# Patient Record
Sex: MALE | Race: WHITE | NOT HISPANIC OR LATINO | Employment: FULL TIME | URBAN - METROPOLITAN AREA
[De-identification: names, ages, dates, MRNs, and addresses within clinical notes are randomized per-mention and may not be internally consistent; named-entity substitution may affect disease eponyms.]

---

## 2018-02-13 ENCOUNTER — TELEPHONE (OUTPATIENT)
Dept: FAMILY MEDICINE CLINIC | Facility: CLINIC | Age: 33
End: 2018-02-13

## 2018-02-13 DIAGNOSIS — J11.1 INFLUENZA: Primary | ICD-10-CM

## 2018-02-13 RX ORDER — OSELTAMIVIR PHOSPHATE 75 MG/1
75 CAPSULE ORAL 2 TIMES DAILY
Qty: 10 CAPSULE | Refills: 0 | Status: SHIPPED | OUTPATIENT
Start: 2018-02-13 | End: 2018-02-18

## 2018-02-13 NOTE — TELEPHONE ENCOUNTER
2 children were dx with flu a & b started on meds  Wife not feeling well  Should he start meds?     Please advise    Pharm - [facility]  Allergies - none    Pt is aware if no call back will check pharm

## 2018-06-22 ENCOUNTER — OFFICE VISIT (OUTPATIENT)
Dept: URGENT CARE | Facility: MEDICAL CENTER | Age: 33
End: 2018-06-22
Payer: COMMERCIAL

## 2018-06-22 ENCOUNTER — APPOINTMENT (OUTPATIENT)
Dept: RADIOLOGY | Facility: MEDICAL CENTER | Age: 33
End: 2018-06-22
Payer: COMMERCIAL

## 2018-06-22 VITALS
BODY MASS INDEX: 25.36 KG/M2 | DIASTOLIC BLOOD PRESSURE: 88 MMHG | WEIGHT: 181.8 LBS | OXYGEN SATURATION: 99 % | TEMPERATURE: 98.5 F | RESPIRATION RATE: 20 BRPM | HEART RATE: 90 BPM | SYSTOLIC BLOOD PRESSURE: 128 MMHG

## 2018-06-22 DIAGNOSIS — S93.602A FOOT SPRAIN, LEFT, INITIAL ENCOUNTER: Primary | ICD-10-CM

## 2018-06-22 DIAGNOSIS — M79.672 LEFT FOOT PAIN: ICD-10-CM

## 2018-06-22 PROCEDURE — 73630 X-RAY EXAM OF FOOT: CPT

## 2018-06-22 PROCEDURE — 99213 OFFICE O/P EST LOW 20 MIN: CPT | Performed by: PHYSICIAN ASSISTANT

## 2018-06-22 NOTE — PATIENT INSTRUCTIONS
No fracture noted on xray, will call if radiology report differs  Ace wrap applied in office  Use crutches from home as needed for reduced weight bearing  Continue tylenol, ibuprofen, ice and rest   Follow up with your PCP for persistent symptoms  Go to the ER for any distress

## 2018-06-22 NOTE — PROGRESS NOTES
[facility] Now        NAME: Benjamin Hernández is a 28 y o  male  : 1985    MRN: [id number]  DATE: 2018  TIME: 7:28 PM    Assessment and Plan   Foot sprain, left, initial encounter [L43 602A]  1  Foot sprain, left, initial encounter  XR foot 3+ vw left         Patient Instructions     No fracture noted on xray, will call if radiology report differs  Ace wrap applied in office  Use crutches from home as needed for reduced weight bearing  Continue tylenol, ibuprofen, ice and rest   Follow up with PCP in 3-5 days  Proceed to  ER if symptoms worsen  Chief Complaint     Chief Complaint   Patient presents with    Foot Injury         History of Present Illness       This is a 28year old male presenting for pain in his left foot x 1 day  He was running today when he stepped into a pothole and rolled his ankle  He is having pain in a pinpoint area on the dorsal aspect of his foot between th 4th and 5th metatarsals  It is difficult to bear weight on the distal aspect of his foot  He has been icing, ace wrapping, and taking ibuprofen for the pain  No previous injury to the foot  Review of Systems   Review of Systems   Constitutional: Negative for fatigue and fever  Gastrointestinal: Negative for nausea and vomiting  Musculoskeletal: Positive for arthralgias, gait problem and joint swelling  Negative for back pain, myalgias, neck pain and neck stiffness  Skin: Negative for rash  Neurological: Negative for weakness and numbness  Current Medications     No current outpatient prescriptions on file  Current Allergies     Allergies as of 2018    (No Known Allergies)            The following portions of the patient's history were reviewed and updated as appropriate: allergies, current medications, past family history, past medical history, past social history, past surgical history and problem list      History reviewed  No pertinent past medical history      History reviewed  No pertinent surgical history  Family History   Problem Relation Age of Onset    No Known Problems Mother     No Known Problems Father          Medications have been verified  Objective   /88 (BP Location: Right arm, Patient Position: Sitting, Cuff Size: Standard)   Pulse 90   Temp 98 5 °F (36 9 °C) (Temporal)   Resp 20   Wt 82 5 kg (181 lb 12 8 oz)   SpO2 99%   BMI 25 36 kg/m²        Physical Exam     Physical Exam   Constitutional: He appears well-developed and well-nourished  No distress  HENT:   Head: Normocephalic and atraumatic  Nose: Nose normal    Eyes: Conjunctivae and EOM are normal  Pupils are equal, round, and reactive to light  Cardiovascular: Normal rate, regular rhythm and normal heart sounds  Pulmonary/Chest: Effort normal and breath sounds normal  No respiratory distress  He has no wheezes  He has no rales  Musculoskeletal:   Left foot: There is a mild amount of edema, no ecchymosis or open wounds  TTP to the dorsal aspect of the foot between the 4th and 5th metatarsals  No TTP of the ankle, proximal fibula, or toes  FROM of the ankle and foot, sensation intact, distal cap refill less than 3 seconds  Patient is walking with an antalgic gait  Neurological: He is alert  Skin: Skin is warm and dry  He is not diaphoretic  Nursing note and vitals reviewed

## 2019-09-13 ENCOUNTER — TELEPHONE (OUTPATIENT)
Dept: FAMILY MEDICINE CLINIC | Facility: CLINIC | Age: 34
End: 2019-09-13

## 2019-12-27 ENCOUNTER — TELEPHONE (OUTPATIENT)
Dept: FAMILY MEDICINE CLINIC | Facility: CLINIC | Age: 34
End: 2019-12-27

## 2019-12-27 DIAGNOSIS — R68.89 FLU-LIKE SYMPTOMS: Primary | ICD-10-CM

## 2019-12-27 RX ORDER — OSELTAMIVIR PHOSPHATE 75 MG/1
75 CAPSULE ORAL EVERY 12 HOURS SCHEDULED
Qty: 10 CAPSULE | Refills: 0 | Status: SHIPPED | OUTPATIENT
Start: 2019-12-27 | End: 2020-01-01

## 2020-08-14 ENCOUNTER — APPOINTMENT (EMERGENCY)
Dept: RADIOLOGY | Facility: HOSPITAL | Age: 35
End: 2020-08-14
Payer: COMMERCIAL

## 2020-08-14 ENCOUNTER — HOSPITAL ENCOUNTER (EMERGENCY)
Facility: HOSPITAL | Age: 35
Discharge: HOME | End: 2020-08-14
Attending: EMERGENCY MEDICINE
Payer: COMMERCIAL

## 2020-08-14 VITALS
DIASTOLIC BLOOD PRESSURE: 98 MMHG | BODY MASS INDEX: 26.11 KG/M2 | SYSTOLIC BLOOD PRESSURE: 160 MMHG | HEART RATE: 81 BPM | HEIGHT: 71 IN | OXYGEN SATURATION: 99 % | RESPIRATION RATE: 18 BRPM | WEIGHT: 186.51 LBS | TEMPERATURE: 98.4 F

## 2020-08-14 DIAGNOSIS — R07.89 ATYPICAL CHEST PAIN: Primary | ICD-10-CM

## 2020-08-14 DIAGNOSIS — J30.9 ALLERGIC RHINITIS: ICD-10-CM

## 2020-08-14 LAB
ALBUMIN SERPL BCP-MCNC: 4.5 G/DL (ref 3.5–5)
ALP SERPL-CCNC: 101 U/L (ref 46–116)
ALT SERPL W P-5'-P-CCNC: 48 U/L (ref 12–78)
ANION GAP SERPL CALCULATED.3IONS-SCNC: 10 MMOL/L (ref 4–13)
AST SERPL W P-5'-P-CCNC: 25 U/L (ref 5–45)
ATRIAL RATE: 85 BPM
BASOPHILS # BLD AUTO: 0.07 THOUSANDS/ΜL (ref 0–0.1)
BASOPHILS NFR BLD AUTO: 1 % (ref 0–1)
BILIRUB SERPL-MCNC: 0.94 MG/DL (ref 0.2–1)
BILIRUB UR QL STRIP: NEGATIVE
BUN SERPL-MCNC: 10 MG/DL (ref 5–25)
CALCIUM SERPL-MCNC: 9.2 MG/DL (ref 8.3–10.1)
CHLORIDE SERPL-SCNC: 99 MMOL/L (ref 100–108)
CLARITY UR: CLEAR
CO2 SERPL-SCNC: 28 MMOL/L (ref 21–32)
COLOR UR: YELLOW
CREAT SERPL-MCNC: 1.16 MG/DL (ref 0.6–1.3)
EOSINOPHIL # BLD AUTO: 0.1 THOUSAND/ΜL (ref 0–0.61)
EOSINOPHIL NFR BLD AUTO: 2 % (ref 0–6)
ERYTHROCYTE [DISTWIDTH] IN BLOOD BY AUTOMATED COUNT: 12.3 % (ref 11.6–15.1)
GFR SERPL CREATININE-BSD FRML MDRD: 81 ML/MIN/1.73SQ M
GLUCOSE SERPL-MCNC: 126 MG/DL (ref 65–140)
GLUCOSE UR STRIP-MCNC: NEGATIVE MG/DL
HCT VFR BLD AUTO: 50.5 % (ref 36.5–49.3)
HGB BLD-MCNC: 17.3 G/DL (ref 12–17)
HGB UR QL STRIP.AUTO: NEGATIVE
IMM GRANULOCYTES # BLD AUTO: 0.02 THOUSAND/UL (ref 0–0.2)
IMM GRANULOCYTES NFR BLD AUTO: 0 % (ref 0–2)
KETONES UR STRIP-MCNC: NEGATIVE MG/DL
LEUKOCYTE ESTERASE UR QL STRIP: NEGATIVE
LYMPHOCYTES # BLD AUTO: 1.25 THOUSANDS/ΜL (ref 0.6–4.47)
LYMPHOCYTES NFR BLD AUTO: 19 % (ref 14–44)
MCH RBC QN AUTO: 30.5 PG (ref 26.8–34.3)
MCHC RBC AUTO-ENTMCNC: 34.3 G/DL (ref 31.4–37.4)
MCV RBC AUTO: 89 FL (ref 82–98)
MONOCYTES # BLD AUTO: 0.47 THOUSAND/ΜL (ref 0.17–1.22)
MONOCYTES NFR BLD AUTO: 7 % (ref 4–12)
NEUTROPHILS # BLD AUTO: 4.6 THOUSANDS/ΜL (ref 1.85–7.62)
NEUTS SEG NFR BLD AUTO: 71 % (ref 43–75)
NITRITE UR QL STRIP: NEGATIVE
NRBC BLD AUTO-RTO: 0 /100 WBCS
P AXIS: 49 DEGREES
PH UR STRIP.AUTO: 7.5 [PH] (ref 4.5–8)
PLATELET # BLD AUTO: 186 THOUSANDS/UL (ref 149–390)
PMV BLD AUTO: 10.2 FL (ref 8.9–12.7)
POTASSIUM SERPL-SCNC: 4.2 MMOL/L (ref 3.5–5.3)
PR INTERVAL: 170 MS
PROT SERPL-MCNC: 7.7 G/DL (ref 6.4–8.2)
PROT UR STRIP-MCNC: NEGATIVE MG/DL
QRS AXIS: 50 DEGREES
QRSD INTERVAL: 92 MS
QT INTERVAL: 342 MS
QTC INTERVAL: 406 MS
RBC # BLD AUTO: 5.67 MILLION/UL (ref 3.88–5.62)
SODIUM SERPL-SCNC: 137 MMOL/L (ref 136–145)
SP GR UR STRIP.AUTO: 1.01 (ref 1–1.03)
T WAVE AXIS: 50 DEGREES
TROPONIN I SERPL-MCNC: <0.02 NG/ML
UROBILINOGEN UR QL STRIP.AUTO: 0.2 E.U./DL
VENTRICULAR RATE: 85 BPM
WBC # BLD AUTO: 6.51 THOUSAND/UL (ref 4.31–10.16)

## 2020-08-14 PROCEDURE — 93010 ELECTROCARDIOGRAM REPORT: CPT | Performed by: INTERNAL MEDICINE

## 2020-08-14 PROCEDURE — 84484 ASSAY OF TROPONIN QUANT: CPT | Performed by: PHYSICIAN ASSISTANT

## 2020-08-14 PROCEDURE — 96360 HYDRATION IV INFUSION INIT: CPT

## 2020-08-14 PROCEDURE — 99285 EMERGENCY DEPT VISIT HI MDM: CPT | Performed by: PHYSICIAN ASSISTANT

## 2020-08-14 PROCEDURE — 36415 COLL VENOUS BLD VENIPUNCTURE: CPT | Performed by: PHYSICIAN ASSISTANT

## 2020-08-14 PROCEDURE — 93005 ELECTROCARDIOGRAM TRACING: CPT

## 2020-08-14 PROCEDURE — 85025 COMPLETE CBC W/AUTO DIFF WBC: CPT | Performed by: PHYSICIAN ASSISTANT

## 2020-08-14 PROCEDURE — 99285 EMERGENCY DEPT VISIT HI MDM: CPT

## 2020-08-14 PROCEDURE — 80053 COMPREHEN METABOLIC PANEL: CPT | Performed by: PHYSICIAN ASSISTANT

## 2020-08-14 PROCEDURE — 71045 X-RAY EXAM CHEST 1 VIEW: CPT

## 2020-08-14 PROCEDURE — 81003 URINALYSIS AUTO W/O SCOPE: CPT

## 2020-08-14 RX ORDER — LORATADINE 10 MG/1
10 TABLET ORAL DAILY
COMMUNITY
End: 2021-02-19 | Stop reason: ALTCHOICE

## 2020-08-14 RX ORDER — FLUTICASONE PROPIONATE 50 MCG
1 SPRAY, SUSPENSION (ML) NASAL DAILY
Qty: 16 G | Refills: 0 | Status: SHIPPED | OUTPATIENT
Start: 2020-08-14 | End: 2021-02-19 | Stop reason: ALTCHOICE

## 2020-08-14 RX ORDER — MAGNESIUM HYDROXIDE/ALUMINUM HYDROXICE/SIMETHICONE 120; 1200; 1200 MG/30ML; MG/30ML; MG/30ML
30 SUSPENSION ORAL ONCE
Status: COMPLETED | OUTPATIENT
Start: 2020-08-14 | End: 2020-08-14

## 2020-08-14 RX ORDER — LIDOCAINE HYDROCHLORIDE 20 MG/ML
15 SOLUTION OROPHARYNGEAL ONCE
Status: COMPLETED | OUTPATIENT
Start: 2020-08-14 | End: 2020-08-14

## 2020-08-14 RX ADMIN — ALUMINUM HYDROXIDE, MAGNESIUM HYDROXIDE, AND SIMETHICONE 30 ML: 200; 200; 20 SUSPENSION ORAL at 09:13

## 2020-08-14 RX ADMIN — LIDOCAINE HYDROCHLORIDE 15 ML: 20 SOLUTION ORAL; TOPICAL at 09:13

## 2020-08-14 RX ADMIN — SODIUM CHLORIDE 1000 ML: 0.9 INJECTION, SOLUTION INTRAVENOUS at 09:16

## 2020-08-14 NOTE — ED PROVIDER NOTES
History  Chief Complaint   Patient presents with    Chest Pain     PT states he was having chest pain and a sore throat with one episode of vomiting last night which caused some anxiety  States still having some high dull chest pain and some anxiety  54-year-old male presents the emergency department with complaints of chest discomfort  States that his symptoms began last night  Patient reports that he has a history of reflux and has postnasal drip  States that he has been meaning to make an appointment with an allergist but has been unable to do so  Patient states that he started with some vague symptoms of a sore throat and chest discomfort last night and had some vomiting  Patient reports that he has some nausea with dry heaving approximately twice weekly due to postnasal drip  States that since then he has felt intermittently short of breath and increasingly anxious  Reports history of 1 panic attack several years ago without persistent anxiety symptoms  He has no concerns for COVID infection  History provided by:  Patient   used: No        Prior to Admission Medications   Prescriptions Last Dose Informant Patient Reported? Taking?   loratadine (CLARITIN) 10 mg tablet   Yes Yes   Sig: Take 10 mg by mouth daily      Facility-Administered Medications: None       History reviewed  No pertinent past medical history  History reviewed  No pertinent surgical history  Family History   Problem Relation Age of Onset    No Known Problems Mother     No Known Problems Father      I have reviewed and agree with the history as documented      E-Cigarette/Vaping     E-Cigarette/Vaping Substances     Social History     Tobacco Use    Smoking status: Never Smoker    Smokeless tobacco: Never Used   Substance Use Topics    Alcohol use: Yes     Frequency: 2-3 times a week    Drug use: Never       Review of Systems   Constitutional: Negative for activity change, appetite change, chills and fever  HENT: Positive for sore throat  Negative for congestion, dental problem, drooling, ear discharge, ear pain, mouth sores, nosebleeds, rhinorrhea and trouble swallowing  Post nasal drip   Eyes: Negative for pain, discharge and itching  Respiratory: Negative for cough, chest tightness, shortness of breath and wheezing  Cardiovascular: Positive for chest pain  Negative for palpitations  Gastrointestinal: Positive for abdominal pain and vomiting  Negative for blood in stool, constipation, diarrhea and nausea  Endocrine: Negative for cold intolerance and heat intolerance  Genitourinary: Negative for difficulty urinating, dysuria, flank pain, frequency and urgency  Skin: Negative for rash and wound  Allergic/Immunologic: Negative for food allergies and immunocompromised state  Neurological: Negative for dizziness, seizures, syncope, weakness, numbness and headaches  Psychiatric/Behavioral: Negative for agitation, behavioral problems and confusion  Physical Exam  Physical Exam  Vitals signs and nursing note reviewed  Constitutional:       General: He is not in acute distress  Appearance: He is not diaphoretic  HENT:      Head: Normocephalic and atraumatic  Right Ear: External ear normal       Left Ear: External ear normal       Mouth/Throat:      Pharynx: No oropharyngeal exudate  Eyes:      Conjunctiva/sclera: Conjunctivae normal    Neck:      Vascular: No JVD  Trachea: No tracheal deviation  Cardiovascular:      Rate and Rhythm: Normal rate and regular rhythm  Heart sounds: Normal heart sounds  No murmur  No friction rub  No gallop  Pulmonary:      Effort: No respiratory distress  Breath sounds: No decreased breath sounds, wheezing or rales  Chest:      Chest wall: No tenderness  Abdominal:      General: There is no distension  Musculoskeletal: Normal range of motion  General: No tenderness or deformity     Lymphadenopathy: Cervical: No cervical adenopathy  Skin:     General: Skin is warm and dry  Findings: No erythema or rash  Neurological:      Mental Status: He is alert and oriented to person, place, and time           Vital Signs  ED Triage Vitals   Temperature Pulse Respirations Blood Pressure SpO2   08/14/20 0849 08/14/20 0850 08/14/20 0850 08/14/20 0850 08/14/20 0850   98 4 °F (36 9 °C) 81 18 160/98 99 %      Temp Source Heart Rate Source Patient Position - Orthostatic VS BP Location FiO2 (%)   08/14/20 0850 08/14/20 0850 08/14/20 0850 08/14/20 0850 --   Oral Monitor Lying Right arm       Pain Score       --                  Vitals:    08/14/20 0850   BP: 160/98   Pulse: 81   Patient Position - Orthostatic VS: Lying         Visual Acuity      ED Medications  Medications   sodium chloride 0 9 % bolus 1,000 mL (0 mL Intravenous Stopped 8/14/20 1016)   aluminum-magnesium hydroxide-simethicone (MYLANTA) 200-200-20 mg/5 mL oral suspension 30 mL (30 mL Oral Given 8/14/20 0913)   Lidocaine Viscous HCl (XYLOCAINE) 2 % mucosal solution 15 mL (15 mL Swish & Swallow Given 8/14/20 0913)       Diagnostic Studies  Results Reviewed     Procedure Component Value Units Date/Time    Troponin I [[id number]]  (Normal) Collected:  08/14/20 0916    Lab Status:  Final result Specimen:  Blood from Arm, Right Updated:  08/14/20 0940     Troponin I <0 02 ng/mL     Comprehensive metabolic panel [[id number]]  (Abnormal) Collected:  08/14/20 0916    Lab Status:  Final result Specimen:  Blood from Arm, Right Updated:  08/14/20 0938     Sodium 137 mmol/L      Potassium 4 2 mmol/L      Chloride 99 mmol/L      CO2 28 mmol/L      ANION GAP 10 mmol/L      BUN 10 mg/dL      Creatinine 1 16 mg/dL      Glucose 126 mg/dL      Calcium 9 2 mg/dL      AST 25 U/L      ALT 48 U/L      Alkaline Phosphatase 101 U/L      Total Protein 7 7 g/dL      Albumin 4 5 g/dL      Total Bilirubin 0 94 mg/dL      eGFR 81 ml/min/1 73sq m     Narrative:       National Kidney Disease Foundation guidelines for Chronic Kidney Disease (CKD):     Stage 1 with normal or high GFR (GFR > 90 mL/min/1 73 square meters)    Stage 2 Mild CKD (GFR = 60-89 mL/min/1 73 square meters)    Stage 3A Moderate CKD (GFR = 45-59 mL/min/1 73 square meters)    Stage 3B Moderate CKD (GFR = 30-44 mL/min/1 73 square meters)    Stage 4 Severe CKD (GFR = 15-29 mL/min/1 73 square meters)    Stage 5 End Stage CKD (GFR <15 mL/min/1 73 square meters)  Note: GFR calculation is accurate only with a steady state creatinine    Urine Macroscopic, POC [[id number]] Collected:  08/14/20 0943    Lab Status:  Final result Specimen:  Urine Updated:  08/14/20 0926     Color, UA Yellow     Clarity, UA Clear     pH, UA 7 5     Leukocytes, UA Negative     Nitrite, UA Negative     Protein, UA Negative mg/dl      Glucose, UA Negative mg/dl      Ketones, UA Negative mg/dl      Urobilinogen, UA 0 2 E U /dl      Bilirubin, UA Negative     Blood, UA Negative     Specific Gravity, UA 1 015    Narrative:       CLINITEK RESULT    CBC and differential [[id number]]  (Abnormal) Collected:  08/14/20 0916    Lab Status:  Final result Specimen:  Blood from Arm, Right Updated:  08/14/20 0923     WBC 6 51 Thousand/uL      RBC 5 67 Million/uL      Hemoglobin 17 3 g/dL      Hematocrit 50 5 %      MCV 89 fL      MCH 30 5 pg      MCHC 34 3 g/dL      RDW 12 3 %      MPV 10 2 fL      Platelets 625 Thousands/uL      nRBC 0 /100 WBCs      Neutrophils Relative 71 %      Immat GRANS % 0 %      Lymphocytes Relative 19 %      Monocytes Relative 7 %      Eosinophils Relative 2 %      Basophils Relative 1 %      Neutrophils Absolute 4 60 Thousands/µL      Immature Grans Absolute 0 02 Thousand/uL      Lymphocytes Absolute 1 25 Thousands/µL      Monocytes Absolute 0 47 Thousand/µL      Eosinophils Absolute 0 10 Thousand/µL      Basophils Absolute 0 07 Thousands/µL                  XR chest 1 view portable   Final Result by Arty Dandy, MD (08/14 8200)      No acute cardiopulmonary disease  Workstation performed: [id number]                    Procedures  ECG 12 Lead Documentation Only    Date/Time: 8/14/2020 9:09 AM  Performed by: Mary Loo PA-C  Authorized by: Mary Loo PA-C     Indications / Diagnosis:  Pain  ECG reviewed by me, the ED Provider: yes    Patient location:  ED  Previous ECG:     Previous ECG:  Unavailable  Interpretation:     Interpretation: normal    Rate:     ECG rate:  84    ECG rate assessment: normal    Rhythm:     Rhythm: sinus rhythm    Ectopy:     Ectopy: none    QRS:     QRS axis:  Normal    QRS intervals:  Normal  Conduction:     Conduction: normal    ST segments:     ST segments:  Normal  T waves:     T waves: normal               ED Course             HEART Risk Score      Most Recent Value   Heart Score Risk Calculator   History  1 Filed at: 08/14/2020 1033   ECG  0 Filed at: 08/14/2020 1033   Age  0 Filed at: 08/14/2020 1033   Risk Factors  0 Filed at: 08/14/2020 1033   Troponin  0 Filed at: 08/14/2020 1033   HEART Score  1 Filed at: 08/14/2020 1033                                      MDM  Number of Diagnoses or Management Options  Allergic rhinitis:   Atypical chest pain:   Diagnosis management comments: Differential diagnosis includes but not limited to:  Sinusitis, postnasal drip, GERD, anxiety, upper respiratory infection    Doubt acute coronary syndrome         Amount and/or Complexity of Data Reviewed  Clinical lab tests: ordered and reviewed  Tests in the radiology section of CPT®: ordered and reviewed          Disposition  Final diagnoses:   Atypical chest pain   Allergic rhinitis     Time reflects when diagnosis was documented in both MDM as applicable and the Disposition within this note     Time User Action Codes Description Comment    8/14/2020 10:36 AM Shasha Maza Add [R07 89] Atypical chest pain     8/14/2020 10:36 AM Shasha Maza Add [J30 9] Allergic rhinitis       ED Disposition     ED Disposition Condition Date/Time Comment    Discharge Stable Fri Aug 14, 2020 10:34 AM Josse Jaquez discharge to home/self care  Follow-up Information     Follow up With Specialties Details Why Contact Info    Vicki Marie MD [facility]   [address]  [facility]      Gabriel Holly MD Otolaryngology   [address] [facility]   [phone]            Discharge Medication List as of 8/14/2020 10:37 AM      START taking these medications    Details   fluticasone (FLONASE) 50 mcg/act nasal spray 1 spray into each nostril daily, Starting Fri 8/14/2020, Until Sat 8/14/2021, Normal         CONTINUE these medications which have NOT CHANGED    Details   loratadine (CLARITIN) 10 mg tablet Take 10 mg by mouth daily, Historical Med           No discharge procedures on file      PDMP Review     None          ED Provider  Electronically Signed by           Elvia Ruiz PA-C  08/14/20 9723

## 2021-02-19 ENCOUNTER — OFFICE VISIT (OUTPATIENT)
Dept: FAMILY MEDICINE CLINIC | Facility: CLINIC | Age: 36
End: 2021-02-19
Payer: COMMERCIAL

## 2021-02-19 VITALS
TEMPERATURE: 98.1 F | SYSTOLIC BLOOD PRESSURE: 128 MMHG | WEIGHT: 174 LBS | HEIGHT: 71 IN | HEART RATE: 74 BPM | BODY MASS INDEX: 24.36 KG/M2 | DIASTOLIC BLOOD PRESSURE: 80 MMHG

## 2021-02-19 DIAGNOSIS — Z00.00 ANNUAL PHYSICAL EXAM: Primary | ICD-10-CM

## 2021-02-19 DIAGNOSIS — R06.81 WITNESSED EPISODE OF APNEA: ICD-10-CM

## 2021-02-19 DIAGNOSIS — G47.34 NOCTURNAL HYPOXIA: ICD-10-CM

## 2021-02-19 DIAGNOSIS — Z13.89 SCREENING FOR CARDIOVASCULAR, RESPIRATORY, AND GENITOURINARY DISEASES: ICD-10-CM

## 2021-02-19 DIAGNOSIS — Z13.83 SCREENING FOR CARDIOVASCULAR, RESPIRATORY, AND GENITOURINARY DISEASES: ICD-10-CM

## 2021-02-19 DIAGNOSIS — Z13.6 SCREENING FOR CARDIOVASCULAR, RESPIRATORY, AND GENITOURINARY DISEASES: ICD-10-CM

## 2021-02-19 PROCEDURE — 1036F TOBACCO NON-USER: CPT | Performed by: FAMILY MEDICINE

## 2021-02-19 PROCEDURE — 3725F SCREEN DEPRESSION PERFORMED: CPT | Performed by: FAMILY MEDICINE

## 2021-02-19 PROCEDURE — 99385 PREV VISIT NEW AGE 18-39: CPT | Performed by: FAMILY MEDICINE

## 2021-02-19 PROCEDURE — 3008F BODY MASS INDEX DOCD: CPT | Performed by: FAMILY MEDICINE

## 2021-02-19 NOTE — PATIENT INSTRUCTIONS

## 2021-02-19 NOTE — PROGRESS NOTES
ADULT ANNUAL [address]    NAME: Maximiliano Jones  AGE: 28 y o  SEX: male  : 1985     DATE: 2021     Assessment and Plan:     Problem List Items Addressed This Visit     None          Immunizations and preventive care screenings were discussed with patient today  Appropriate education was printed on patient's after visit summary  Counseling:  · Exercise: the importance of regular exercise/physical activity was discussed  Recommend exercise 3-5 times per week for at least 30 minutes  No follow-ups on file  Chief Complaint:     Chief Complaint   Patient presents with    New Patient Visit    Physical Exam      History of Present Illness:     Adult Annual Physical   Patient here for a comprehensive physical exam  The patient reports problems - as below  - frequent night time awakening  Watch shows O2 sats and HR drop  Wife notes pt snores and may hold his breath  No change with weight loss  Diet and Physical Activity  · Diet/Nutrition: well balanced diet  · Exercise: 3-4 times a week on average and 30-60 minutes on average  Depression Screening  PHQ-9 Depression Screening    PHQ-9:   Frequency of the following problems over the past two weeks:      Little interest or pleasure in doing things: 0 - not at all  Feeling down, depressed, or hopeless: 0 - not at all  PHQ-2 Score: 0       General Health  · Sleep: sleeps poorly and frequent nighttime awakening  · Hearing: normal - bilateral   · Vision: no vision problems  · Dental: regular dental visits and brushes teeth twice daily   Health  · History of STDs?: no      Review of Systems:     Review of Systems   Constitutional: Negative  HENT: Negative  Eyes: Negative  Respiratory: Negative  Cardiovascular: Negative  Gastrointestinal: Negative  Endocrine: Negative  Genitourinary: Negative  Musculoskeletal: Negative      Skin: Negative  Allergic/Immunologic: Negative  Neurological: Negative  Hematological: Negative  Psychiatric/Behavioral: Positive for sleep disturbance  Negative for dysphoric mood and suicidal ideas  The patient is not hyperactive  Past Medical History:     No past medical history on file  Past Surgical History:     No past surgical history on file     Social History:        Social History     Socioeconomic History    Marital status: /Civil Union     Spouse name: Not on file    Number of children: Not on file    Years of education: Not on file    Highest education level: Not on file   Occupational History    Not on file   Social Needs    Financial resource strain: Not on file    Food insecurity     Worry: Not on file     Inability: Not on file   [employer] needs     Medical: Not on file     Non-medical: Not on file   Tobacco Use    Smoking status: Never Smoker    Smokeless tobacco: Never Used   Substance and Sexual Activity    Alcohol use: Yes     Frequency: 2-3 times a week    Drug use: Never    Sexual activity: Not on file   Lifestyle    Physical activity     Days per week: Not on file     Minutes per session: Not on file    Stress: Not on file   Relationships    Social connections     Talks on phone: Not on file     Gets together: Not on file     Attends [employer] service: Not on file     Active member of club or organization: Not on file     Attends meetings of clubs or organizations: Not on file     Relationship status: Not on file    Intimate partner violence     Fear of current or ex partner: Not on file     Emotionally abused: Not on file     Physically abused: Not on file     Forced sexual activity: Not on file   Other Topics Concern    Not on file   Social History Narrative    Not on file      Family History:     Family History   Problem Relation Age of Onset    No Known Problems Mother     No Known Problems Father       Current Medications:     Current Outpatient Medications   Medication Sig Dispense Refill    fluticasone (FLONASE) 50 mcg/act nasal spray 1 spray into each nostril daily 16 g 0    loratadine (CLARITIN) 10 mg tablet Take 10 mg by mouth daily       No current facility-administered medications for this visit  Allergies:     No Known Allergies   Physical Exam:     There were no vitals taken for this visit  Physical Exam  Vitals signs reviewed  Constitutional:       Appearance: He is well-developed  HENT:      Head: Normocephalic and atraumatic  Right Ear: Tympanic membrane, ear canal and external ear normal       Left Ear: Tympanic membrane, ear canal and external ear normal    Eyes:      Extraocular Movements: Extraocular movements intact  Conjunctiva/sclera: Conjunctivae normal       Pupils: Pupils are equal, round, and reactive to light  Neck:      Musculoskeletal: Normal range of motion and neck supple  No muscular tenderness  Thyroid: No thyromegaly  Vascular: No JVD  Cardiovascular:      Rate and Rhythm: Normal rate and regular rhythm  Pulses: Normal pulses  Heart sounds: Normal heart sounds  No murmur  Pulmonary:      Effort: Pulmonary effort is normal  No respiratory distress  Breath sounds: Normal breath sounds  No wheezing or rales  Abdominal:      General: Bowel sounds are normal  There is no distension  Palpations: Abdomen is soft  There is no mass  Tenderness: There is no abdominal tenderness  There is no right CVA tenderness or left CVA tenderness  Musculoskeletal: Normal range of motion  General: No swelling, tenderness or deformity  Right lower leg: No edema  Left lower leg: No edema  Lymphadenopathy:      Cervical: No cervical adenopathy  Skin:     General: Skin is warm  Capillary Refill: Capillary refill takes less than 2 seconds  Neurological:      Mental Status: He is alert and oriented to person, place, and time        Cranial Nerves: No cranial nerve deficit  Sensory: No sensory deficit  Motor: No weakness or abnormal muscle tone  Coordination: Coordination normal       Gait: Gait normal       Deep Tendon Reflexes: Reflexes normal    Psychiatric:         Mood and Affect: Mood normal          Behavior: Behavior normal          Thought Content:  Thought content normal          Judgment: Judgment normal           Hannah Tracy

## 2021-09-28 ENCOUNTER — OFFICE VISIT (OUTPATIENT)
Dept: FAMILY MEDICINE CLINIC | Facility: CLINIC | Age: 36
End: 2021-09-28
Payer: COMMERCIAL

## 2021-09-28 ENCOUNTER — HOSPITAL ENCOUNTER (OUTPATIENT)
Dept: RADIOLOGY | Facility: HOSPITAL | Age: 36
Discharge: HOME | End: 2021-09-28
Payer: COMMERCIAL

## 2021-09-28 VITALS
SYSTOLIC BLOOD PRESSURE: 140 MMHG | OXYGEN SATURATION: 99 % | HEART RATE: 78 BPM | TEMPERATURE: 98.8 F | HEIGHT: 71 IN | BODY MASS INDEX: 23.94 KG/M2 | WEIGHT: 171 LBS | RESPIRATION RATE: 18 BRPM | DIASTOLIC BLOOD PRESSURE: 88 MMHG

## 2021-09-28 DIAGNOSIS — R07.81 RIB PAIN ON LEFT SIDE: ICD-10-CM

## 2021-09-28 DIAGNOSIS — R07.81 RIB PAIN ON LEFT SIDE: Primary | ICD-10-CM

## 2021-09-28 PROCEDURE — 71101 X-RAY EXAM UNILAT RIBS/CHEST: CPT

## 2021-09-28 PROCEDURE — 1036F TOBACCO NON-USER: CPT | Performed by: FAMILY MEDICINE

## 2021-09-28 PROCEDURE — 3008F BODY MASS INDEX DOCD: CPT | Performed by: FAMILY MEDICINE

## 2021-09-28 PROCEDURE — 3725F SCREEN DEPRESSION PERFORMED: CPT | Performed by: FAMILY MEDICINE

## 2021-09-28 PROCEDURE — 99213 OFFICE O/P EST LOW 20 MIN: CPT | Performed by: FAMILY MEDICINE

## 2021-09-28 NOTE — PROGRESS NOTES
Derick Greene 1985 male MRN: [id number]    Acute Visit    Assessment/Plan   Pal Ortiz was seen today for chest pain  Diagnoses and all orders for this visit:    Rib pain on left side  -     XR ribs left w pa chest min 3 views; Future    suspect strain of intercostal muscles vs costochondritis   Obtain XR to rule out break given upcoming race  Will consider prednisone burst if no fracture   Discussed pain management, bracing, etc      Cathy Gilbert MD  [address]  9/28/2021      Please be aware that this note contains text that was dictated and there may be errors pertaining to "sound-alike "words during the dictation process  SUBJECTIVE    CC: Chest Pain (left side)      HPI:  Derick Greene is a 39 y o  male who presented for an acute visit complaining of left sided rib pain  He was trail running on Saturday and tripped over a rock, landed funny but still on his feet, with compression toward the left side  He felt pain in his lower rib cage, but was deep in the run so had to run 9 miles to return to his car  Since then he's had pain in the same spot without radiation  Hurts with repetition of injury movement (anterior flexion)  Able to breathe and walk comfortably  Does have a 50k race in 12 days he would like to participate in  Review of Systems   HENT: Negative for congestion  Respiratory: Negative for chest tightness, shortness of breath and wheezing  Cardiovascular: Positive for chest pain  Skin: Negative for color change, rash and wound  All other systems reviewed and are negative  Medications:   Meds/Allergies   No current outpatient medications on file  No current facility-administered medications for this visit         No Known Allergies    OBJECTIVE    Vitals:   Vitals:    09/28/21 1334   BP: 140/88   Pulse: 78   Resp: 18   Temp: 98 8 °F (37 1 °C)   SpO2: 99%   Weight: 77 6 kg (171 lb)   Height: 5' 11" (1 803 m)       Physical Exam  Vitals and nursing note reviewed  Constitutional:       General: He is not in acute distress  Appearance: He is well-developed  He is not diaphoretic  HENT:      Head: Normocephalic and atraumatic  Right Ear: External ear normal       Left Ear: External ear normal    Eyes:      Conjunctiva/sclera: Conjunctivae normal    Pulmonary:      Effort: Pulmonary effort is normal  No respiratory distress  Abdominal:          Comments: Point tenderness over cartilage-bone connection of hanging ribs  No bony deformity palpated  Skin:     Findings: No rash  Neurological:      Mental Status: He is alert  Cranial Nerves: No cranial nerve deficit

## 2021-09-29 ENCOUNTER — TELEPHONE (OUTPATIENT)
Dept: FAMILY MEDICINE CLINIC | Facility: CLINIC | Age: 36
End: 2021-09-29

## 2021-09-29 DIAGNOSIS — M94.0 ACUTE COSTOCHONDRITIS: Primary | ICD-10-CM

## 2021-09-29 PROCEDURE — U0005 INFEC AGEN DETEC AMPLI PROBE: HCPCS | Performed by: FAMILY MEDICINE

## 2021-09-29 PROCEDURE — U0003 INFECTIOUS AGENT DETECTION BY NUCLEIC ACID (DNA OR RNA); SEVERE ACUTE RESPIRATORY SYNDROME CORONAVIRUS 2 (SARS-COV-2) (CORONAVIRUS DISEASE [COVID-19]), AMPLIFIED PROBE TECHNIQUE, MAKING USE OF HIGH THROUGHPUT TECHNOLOGIES AS DESCRIBED BY CMS-2020-01-R: HCPCS | Performed by: FAMILY MEDICINE

## 2021-09-29 RX ORDER — PREDNISONE 20 MG/1
20 TABLET ORAL DAILY
Qty: 5 TABLET | Refills: 0 | Status: SHIPPED | OUTPATIENT
Start: 2021-09-29 | End: 2021-10-04

## 2022-02-28 ENCOUNTER — OFFICE VISIT (OUTPATIENT)
Dept: FAMILY MEDICINE CLINIC | Facility: CLINIC | Age: 37
End: 2022-02-28
Payer: COMMERCIAL

## 2022-02-28 VITALS
OXYGEN SATURATION: 99 % | BODY MASS INDEX: 24.36 KG/M2 | TEMPERATURE: 98.7 F | WEIGHT: 174 LBS | HEART RATE: 81 BPM | DIASTOLIC BLOOD PRESSURE: 82 MMHG | HEIGHT: 71 IN | SYSTOLIC BLOOD PRESSURE: 120 MMHG

## 2022-02-28 DIAGNOSIS — Z00.00 ANNUAL PHYSICAL EXAM: Primary | ICD-10-CM

## 2022-02-28 DIAGNOSIS — Z13.83 SCREENING FOR CARDIOVASCULAR, RESPIRATORY, AND GENITOURINARY DISEASES: ICD-10-CM

## 2022-02-28 DIAGNOSIS — Z13.89 SCREENING FOR CARDIOVASCULAR, RESPIRATORY, AND GENITOURINARY DISEASES: ICD-10-CM

## 2022-02-28 DIAGNOSIS — Z13.6 SCREENING FOR CARDIOVASCULAR, RESPIRATORY, AND GENITOURINARY DISEASES: ICD-10-CM

## 2022-02-28 PROBLEM — R06.81 WITNESSED EPISODE OF APNEA: Status: RESOLVED | Noted: 2021-02-19 | Resolved: 2022-02-28

## 2022-02-28 PROCEDURE — 99395 PREV VISIT EST AGE 18-39: CPT | Performed by: FAMILY MEDICINE

## 2022-02-28 RX ORDER — DIPHENOXYLATE HYDROCHLORIDE AND ATROPINE SULFATE 2.5; .025 MG/1; MG/1
1 TABLET ORAL DAILY
COMMUNITY

## 2022-02-28 NOTE — PROGRESS NOTES
ADULT ANNUAL [address]    NAME: Beau Ortiz  AGE: 39 y o  SEX: male  : 1985     DATE: 2022     Assessment and Plan:     Problem List Items Addressed This Visit     None      Visit Diagnoses     Annual physical exam    -  Primary    normal exam      Screening for cardiovascular, respiratory, and genitourinary diseases        Relevant Orders    Comprehensive metabolic panel    Lipid panel          Immunizations and preventive care screenings were discussed with patient today  Appropriate education was printed on patient's after visit summary  Counseling:  · Exercise: the importance of regular exercise/physical activity was discussed  Recommend exercise 3-5 times per week for at least 30 minutes  No follow-ups on file  Chief Complaint:     Chief Complaint   Patient presents with    Physical Exam      History of Present Illness:     Adult Annual Physical   Patient here for a comprehensive physical exam  The patient reports no problems  Diet and Physical Activity  · Diet/Nutrition: well balanced diet  · Exercise: moderate cardiovascular exercise, 5-7 times a week on average and 30-60 minutes on average  Depression Screening  PHQ-2/9 Depression Screening    Little interest or pleasure in doing things: 0 - not at all  Feeling down, depressed, or hopeless: 0 - not at all  PHQ-2 Score: 0  PHQ-2 Interpretation: Negative depression screen       General Health  · Sleep: sleeps well and gets 7-8 hours of sleep on average  · Hearing: normal - bilateral   · Vision: no vision problems  · Dental: regular dental visits and brushes teeth twice daily   Health  · History of STDs?: no      Review of Systems:     Review of Systems   Constitutional: Negative  HENT: Negative  Eyes: Negative  Respiratory: Negative  Cardiovascular: Negative  Gastrointestinal: Negative  Endocrine: Negative  Genitourinary: Negative  Musculoskeletal: Negative  Skin: Negative  Allergic/Immunologic: Negative  Neurological: Negative  Hematological: Negative  Psychiatric/Behavioral: Negative  Past Medical History:     No past medical history on file  Past Surgical History:     No past surgical history on file  Social History:     Social History     Socioeconomic History    Marital status: /Civil Union     Spouse name: None    Number of children: None    Years of education: None    Highest education level: None   Occupational History    None   Tobacco Use    Smoking status: Never Smoker    Smokeless tobacco: Never Used   Substance and Sexual Activity    Alcohol use: Yes    Drug use: Never    Sexual activity: None   Other Topics Concern    None   Social History Narrative    None     Social Determinants of Health     Financial Resource Strain: Not on file   Food Insecurity: Not on file   Transportation Needs: Not on file   Physical Activity: Not on file   Stress: Not on file   Social Connections: Not on file   Intimate Partner Violence: Not on file   Housing Stability: Not on file      Family History:     Family History   Problem Relation Age of Onset    No Known Problems Mother     No Known Problems Father       Current Medications:     Current Outpatient Medications   Medication Sig Dispense Refill    multivitamin (THERAGRAN) TABS Take 1 tablet by mouth daily       No current facility-administered medications for this visit  Allergies:     No Known Allergies   Physical Exam:     /82   Pulse 81   Temp 98 7 °F (37 1 °C)   Ht 5' 11" (1 803 m)   Wt 78 9 kg (174 lb)   SpO2 99%   BMI 24 27 kg/m²     Physical Exam  Vitals reviewed  Constitutional:       Appearance: He is well-developed  HENT:      Head: Normocephalic and atraumatic        Right Ear: Tympanic membrane, ear canal and external ear normal       Left Ear: Tympanic membrane, ear canal and external ear normal    Eyes:      Extraocular Movements: Extraocular movements intact  Conjunctiva/sclera: Conjunctivae normal       Pupils: Pupils are equal, round, and reactive to light  Neck:      Thyroid: No thyromegaly  Vascular: No JVD  Cardiovascular:      Rate and Rhythm: Normal rate and regular rhythm  Pulses: Normal pulses  Heart sounds: Normal heart sounds  No murmur heard  Pulmonary:      Effort: Pulmonary effort is normal  No respiratory distress  Breath sounds: Normal breath sounds  No wheezing or rales  Abdominal:      General: Bowel sounds are normal  There is no distension  Palpations: Abdomen is soft  There is no mass  Tenderness: There is no abdominal tenderness  Musculoskeletal:         General: No swelling, tenderness or deformity  Normal range of motion  Cervical back: Normal range of motion and neck supple  No muscular tenderness  Right lower leg: No edema  Left lower leg: No edema  Lymphadenopathy:      Cervical: No cervical adenopathy  Skin:     General: Skin is warm  Capillary Refill: Capillary refill takes less than 2 seconds  Neurological:      Mental Status: He is alert and oriented to person, place, and time  Cranial Nerves: No cranial nerve deficit  Sensory: No sensory deficit  Motor: No weakness or abnormal muscle tone  Coordination: Coordination normal       Gait: Gait normal       Deep Tendon Reflexes: Reflexes normal    Psychiatric:         Mood and Affect: Mood normal          Behavior: Behavior normal          Thought Content:  Thought content normal          Judgment: Judgment normal           MD Jacquelin Evangelista

## 2022-02-28 NOTE — PATIENT INSTRUCTIONS

## 2024-08-14 ENCOUNTER — OFFICE VISIT (OUTPATIENT)
Dept: FAMILY MEDICINE CLINIC | Facility: CLINIC | Age: 39
End: 2024-08-14

## 2024-08-14 VITALS
HEART RATE: 79 BPM | WEIGHT: 183.8 LBS | DIASTOLIC BLOOD PRESSURE: 78 MMHG | SYSTOLIC BLOOD PRESSURE: 120 MMHG | TEMPERATURE: 97.6 F | HEIGHT: 71 IN | OXYGEN SATURATION: 98 % | BODY MASS INDEX: 25.73 KG/M2

## 2024-08-14 DIAGNOSIS — Z30.09 VASECTOMY EVALUATION: ICD-10-CM

## 2024-08-14 DIAGNOSIS — Z00.00 ANNUAL PHYSICAL EXAM: Primary | ICD-10-CM

## 2024-08-14 DIAGNOSIS — R45.89 DYSPHORIC MOOD: ICD-10-CM

## 2024-08-14 DIAGNOSIS — Z13.83 SCREENING FOR CARDIOVASCULAR, RESPIRATORY, AND GENITOURINARY DISEASES: ICD-10-CM

## 2024-08-14 DIAGNOSIS — Z13.6 SCREENING FOR CARDIOVASCULAR, RESPIRATORY, AND GENITOURINARY DISEASES: ICD-10-CM

## 2024-08-14 DIAGNOSIS — Z13.89 SCREENING FOR CARDIOVASCULAR, RESPIRATORY, AND GENITOURINARY DISEASES: ICD-10-CM

## 2024-08-14 PROCEDURE — 99213 OFFICE O/P EST LOW 20 MIN: CPT | Performed by: FAMILY MEDICINE

## 2024-08-14 PROCEDURE — 99395 PREV VISIT EST AGE 18-39: CPT | Performed by: FAMILY MEDICINE

## 2024-08-14 NOTE — PROGRESS NOTES
Adult Annual Physical  Name: Emeterio Shelley      : 1985      MRN: [id number]  Encounter Provider: Ja Smith MD  Encounter Date: 2024   Encounter department: [facility]    Assessment & Plan   1. Annual physical exam  2. Dysphoric mood  Assessment & Plan:  PHQ-2 = 2  but pt is struggling at times with the loss. REC: refer for counseling. Recheck 3m if not improving  Orders:  -     Ambulatory referral to Psych Services; Future  -     TSH, 3rd generation with Free T4 reflex; Future  3. Vasectomy evaluation  Assessment & Plan:  Referred to Urology  Orders:  -     Ambulatory Referral to Urology; Future  4. Screening for cardiovascular, respiratory, and genitourinary diseases  -     CBC and differential; Future  -     Comprehensive metabolic panel; Future  -     Lipid panel; Future  Immunizations and preventive care screenings were discussed with patient today. Appropriate education was printed on patient's after visit summary.    Counseling:  Exercise: the importance of regular exercise/physical activity was discussed. Recommend exercise 3-5 times per week for at least 30 minutes.          History of Present Illness     Adult Annual Physical:  Patient presents for annual physical.   - pt with some labile mood. 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  PHQ done.  Pt interested in counseling  - pt interested in vasectomy.     Diet and Physical Activity:  - Diet/Nutrition: well balanced diet and adequate fiber intake.  - Exercise: moderate cardiovascular exercise, 3-4 times a week on average and 1-2 hours on average.    Depression Screening:  - PHQ-2 Score: 2    General Health:  - Sleep: sleeps well and 7-8 hours of sleep on average.  - Hearing: normal hearing bilateral ears.  - Vision: no vision problems and most recent eye exam > 1 year ago.  - Dental: regular dental visits and brushes teeth twice daily.     Health:  - History of STDs: no.    - Urinary symptoms: none.     Advanced Care Planning:  - Has an advanced directive?: no    - Has a durable medical POA?: no    - ACP document given to patient?: no      Review of Systems   Constitutional: Negative.    HENT: Negative.     Eyes: Negative.    Respiratory: Negative.     Cardiovascular: Negative.    Gastrointestinal: Negative.    Endocrine: Negative.    Genitourinary: Negative.    Musculoskeletal: Negative.    Skin: Negative.    Allergic/Immunologic: Negative.    Neurological: Negative.    Hematological: Negative.    Psychiatric/Behavioral:  Positive for dysphoric mood. Negative for sleep disturbance. The patient is not nervous/anxious.      Past Medical History   Past Medical History:   Diagnosis Date   • Allergic 6/1/03    Seasonal Allergies since I was 18     History reviewed. No pertinent surgical history.  Family History   Problem Relation Age of Onset   • No Known Problems Mother    • No Known Problems Father    • Cancer Sister         My sister was diagnosed with colon cancer when she was 26 (now 40)     Current Outpatient Medications on File Prior to Visit   Medication Sig Dispense Refill   • multivitamin (THERAGRAN) TABS Take 1 tablet by mouth daily       No current facility-administered medications on file prior to visit.   No Known Allergies   Current Outpatient Medications on File Prior to Visit   Medication Sig Dispense Refill   • multivitamin (THERAGRAN) TABS Take 1 tablet by mouth daily       No current facility-administered medications on file prior to visit.      Social History     Tobacco Use   • Smoking status: Never   • Smokeless tobacco: Never   Vaping Use   • Vaping status: Never Used   Substance and Sexual Activity   • Alcohol use: Yes     Alcohol/week: 10.0 standard drinks of alcohol     Types: 10 Cans of beer per week   • Drug use: Never   • Sexual activity: Yes     Partners: Female     Comment: One partner ()       Objective     /78 (BP Location: Left arm, Patient  "Position: Sitting, Cuff Size: Large)   Pulse 79   Temp 97.6 °F (36.4 °C)   Ht 5' 11\" (1.803 m)   Wt 83.4 kg (183 lb 12.8 oz)   SpO2 98%   BMI 25.63 kg/m²     Physical Exam  Vitals reviewed.   Constitutional:       Appearance: He is well-developed.   HENT:      Head: Normocephalic and atraumatic.      Right Ear: Tympanic membrane, ear canal and external ear normal.      Left Ear: Tympanic membrane, ear canal and external ear normal.      Nose: Nose normal.      Mouth/Throat:      Mouth: Mucous membranes are moist.   Eyes:      Extraocular Movements: Extraocular movements intact.      Conjunctiva/sclera: Conjunctivae normal.      Pupils: Pupils are equal, round, and reactive to light.   Neck:      Thyroid: No thyromegaly.      Vascular: No JVD.   Cardiovascular:      Rate and Rhythm: Normal rate and regular rhythm.      Pulses: Normal pulses.      Heart sounds: Normal heart sounds. No murmur heard.  Pulmonary:      Effort: Pulmonary effort is normal. No respiratory distress.      Breath sounds: Normal breath sounds. No wheezing or rales.   Abdominal:      General: Bowel sounds are normal. There is no distension.      Palpations: Abdomen is soft. There is no mass.      Tenderness: There is no abdominal tenderness.   Musculoskeletal:         General: No swelling, tenderness or deformity. Normal range of motion.      Cervical back: Normal range of motion and neck supple. No tenderness. No muscular tenderness.      Right lower leg: No edema.      Left lower leg: No edema.   Lymphadenopathy:      Cervical: No cervical adenopathy.   Skin:     General: Skin is warm.      Capillary Refill: Capillary refill takes less than 2 seconds.   Neurological:      Mental Status: He is alert and oriented to person, place, and time.      Cranial Nerves: No cranial nerve deficit.      Sensory: No sensory deficit.      Motor: No weakness or abnormal muscle tone.      Coordination: Coordination normal.      Gait: Gait normal.      Deep " Tendon Reflexes: Reflexes normal.   Psychiatric:         Mood and Affect: Mood normal.         Behavior: Behavior normal.         Thought Content: Thought content normal.         Judgment: Judgment normal.      Comments: PHQ-2/9 Depression Screening    Little interest or pleasure in doing things: 1 - several days  Feeling down, depressed, or hopeless: 1 - several days  PHQ-2 Score: 2  PHQ-2 Interpretation: Negative depression screen

## 2024-08-14 NOTE — ASSESSMENT & PLAN NOTE
PHQ-2 = 2  but pt is struggling at times with the loss. REC: refer for counseling. Recheck 3m if not improving

## 2024-08-15 ENCOUNTER — TELEPHONE (OUTPATIENT)
Age: 39
End: 2024-08-15

## 2024-08-15 NOTE — TELEPHONE ENCOUNTER
Patient has been added to the [facility] wait list with a referral.    Insurance: [employer]  Insurance Type:    Commercial [x]   Medicaid []   [facility] (if applicable)   Medicare []  Location Preference: any  Provider Preference: any

## 2024-08-15 NOTE — TELEPHONE ENCOUNTER
Called Pt in regards to referral received, in attempts to verify needs of services and advised of current wait list.  No answer, lvm for patient to call back at [phone], opt 3.    1st attempt

## 2024-09-17 ENCOUNTER — TELEPHONE (OUTPATIENT)
Age: 39
End: 2024-09-17

## 2024-09-17 NOTE — TELEPHONE ENCOUNTER
New Patient    What is the reason for the patient's appointment?: Vasectomy Consultation     What office location does the patient prefer?: [place] or [place]    Does patient have Imaging/Lab Results: No    Have patient records been requested?: N/A  If No, are the records showing in Epic: N/A    INSURANCE:   Do we accept the patient's insurance or is the patient Self-Pay?: Yes    Insurance Provider: [employer]   Plan Type/Number:   Member ID#: [id number]     HISTORY:   Has the patient had any previous Urologist(s)?: No    Was the patient seen in the ED?: No    Has the patient had any outside testing done?: No    Does the patient have a personal history of cancer?: N/A    Pt scheduled for 11/6/2024 with Dr. Rivas.

## 2024-11-06 ENCOUNTER — OFFICE VISIT (OUTPATIENT)
Dept: UROLOGY | Facility: MEDICAL CENTER | Age: 39
End: 2024-11-06
Payer: COMMERCIAL

## 2024-11-06 VITALS
DIASTOLIC BLOOD PRESSURE: 80 MMHG | HEIGHT: 71 IN | OXYGEN SATURATION: 99 % | WEIGHT: 181 LBS | BODY MASS INDEX: 25.34 KG/M2 | SYSTOLIC BLOOD PRESSURE: 118 MMHG | HEART RATE: 81 BPM

## 2024-11-06 DIAGNOSIS — Z30.09 VASECTOMY EVALUATION: ICD-10-CM

## 2024-11-06 PROCEDURE — 99203 OFFICE O/P NEW LOW 30 MIN: CPT | Performed by: UROLOGY

## 2024-11-06 NOTE — LETTER
2024     Ja Smith MD  [address]  [address]  [facility] [address]    Patient: Emeterio Shelley   YOB: 1985   Date of Visit: 2024       Dear Dr. Smith:    Thank you for referring Emeterio Shelley to me for evaluation. Below are my notes for this consultation.    If you have questions, please do not hesitate to call me. I look forward to following your patient along with you.         Sincerely,        Maco Rivas MD        CC: No Recipients    aMco Rivas MD  2024  4:06 PM  Sign when Signing Visit  Ambulatory Visit  Name: Emeterio Shelley      : 1985      MRN: [id number]  Encounter Provider: Maco Rivas MD  Encounter Date: 2024   Encounter department: [facility]OLOG[facility] [place]    Assessment & Plan  Vasectomy evaluation  Patient has 2 children and he is aware that vasectomy may be irreversible.  He understands risks of bleeding infection recanalization persistent recurrent fertility pain testicular atrophy fluid collections.  He agrees to the procedure and provided verbal and signed informed consent.  Orders:  •  Ambulatory Referral to Urology      History of Present Illness    Emeterio Shelley is a 39 y.o. male who presents requesting elective sterilization in the form of vasectomy.  The patient denies any pre-existing conditions were in regard to his genitalia or urologic condition.  He is otherwise healthy  Greater than 50% counseling face-to-face with patient 20 minutes    History obtained from : patient  Review of Systems   All other systems reviewed and are negative.    Pertinent Medical History          Medical History Reviewed by provider this encounter:  Problems       Past Medical History  Past Medical History:   Diagnosis Date   • Allergic 03    Seasonal Allergies since I was 18     History reviewed. No pertinent surgical history.  Family History   Problem Relation Age of Onset   • No Known Problems  "Mother    • No Known Problems Father    • Cancer Sister         My sister was diagnosed with colon cancer when she was 26 (now 40)     Current Outpatient Medications on File Prior to Visit   Medication Sig Dispense Refill   • multivitamin (THERAGRAN) TABS Take 1 tablet by mouth daily (Patient not taking: Reported on 11/6/2024)       No current facility-administered medications on file prior to visit.   No Known Allergies   Current Outpatient Medications on File Prior to Visit   Medication Sig Dispense Refill   • multivitamin (THERAGRAN) TABS Take 1 tablet by mouth daily (Patient not taking: Reported on 11/6/2024)       No current facility-administered medications on file prior to visit.      Social History     Tobacco Use   • Smoking status: Never   • Smokeless tobacco: Never   Vaping Use   • Vaping status: Never Used   Substance and Sexual Activity   • Alcohol use: Yes     Alcohol/week: 10.0 standard drinks of alcohol     Types: 10 Cans of beer per week   • Drug use: Never   • Sexual activity: Yes     Partners: Female     Comment: One partner ()           Objective    /80 (BP Location: Left arm, Patient Position: Sitting, Cuff Size: Adult)   Pulse 81   Ht 5' 11\" (1.803 m)   Wt 82.1 kg (181 lb)   SpO2 99%   BMI 25.24 kg/m²   Physical Exam  Vitals reviewed.   Constitutional:       General: He is not in acute distress.     Appearance: Normal appearance. He is normal weight. He is not ill-appearing, toxic-appearing or diaphoretic.   HENT:      Head: Normocephalic and atraumatic.      Nose: Nose normal.      Mouth/Throat:      Mouth: Mucous membranes are moist.   Eyes:      Extraocular Movements: Extraocular movements intact.   Pulmonary:      Effort: Pulmonary effort is normal. No respiratory distress.   Abdominal:      General: There is no distension.      Palpations: Abdomen is soft.   Genitourinary:     Penis: Normal.       Testes: Normal.   Musculoskeletal:         General: Normal range of motion. " "     Cervical back: Neck supple.   Neurological:      Mental Status: He is alert and oriented to person, place, and time.   Psychiatric:         Mood and Affect: Mood normal.         Behavior: Behavior normal.         Thought Content: Thought content normal.         Judgment: Judgment normal.       Results  No results found for: \"PSA\"  Lab Results   Component Value Date    CALCIUM 9.2 08/14/2020    K 4.2 08/14/2020    CO2 28 08/14/2020    CL 99 (L) 08/14/2020    BUN 10 08/14/2020    CREATININE 1.16 08/14/2020     Lab Results   Component Value Date    WBC 6.51 08/14/2020    HGB 17.3 (H) 08/14/2020    HCT 50.5 (H) 08/14/2020    MCV 89 08/14/2020     08/14/2020       Office Urine Dip  No results found for this or any previous visit (from the past 1 hour(s)).]    Administrative Statements        "

## 2024-11-06 NOTE — PROGRESS NOTES
Ambulatory Visit  Name: Emeterio Shelley      : 1985      MRN: [id number]  Encounter Provider: Maco Rivas MD  Encounter Date: 2024   Encounter department: [facility] UROLOG[facility] [place]    Assessment & Plan  Vasectomy evaluation  Patient has 2 children and he is aware that vasectomy may be irreversible.  He understands risks of bleeding infection recanalization persistent recurrent fertility pain testicular atrophy fluid collections.  He agrees to the procedure and provided verbal and signed informed consent.  Orders:    Ambulatory Referral to Urology      History of Present Illness     Emeterio Shelley is a 39 y.o. male who presents requesting elective sterilization in the form of vasectomy.  The patient denies any pre-existing conditions were in regard to his genitalia or urologic condition.  He is otherwise healthy  Greater than 50% counseling face-to-face with patient 20 minutes    History obtained from : patient  Review of Systems   All other systems reviewed and are negative.    Pertinent Medical History           Medical History Reviewed by provider this encounter:  Problems       Past Medical History   Past Medical History:   Diagnosis Date    Allergic 03    Seasonal Allergies since I was 18     History reviewed. No pertinent surgical history.  Family History   Problem Relation Age of Onset    No Known Problems Mother     No Known Problems Father     Cancer Sister         My sister was diagnosed with colon cancer when she was 26 (now 40)     Current Outpatient Medications on File Prior to Visit   Medication Sig Dispense Refill    multivitamin (THERAGRAN) TABS Take 1 tablet by mouth daily (Patient not taking: Reported on 2024)       No current facility-administered medications on file prior to visit.   No Known Allergies   Current Outpatient Medications on File Prior to Visit   Medication Sig Dispense Refill    multivitamin (THERAGRAN) TABS Take 1 tablet by mouth daily  "(Patient not taking: Reported on 11/6/2024)       No current facility-administered medications on file prior to visit.      Social History     Tobacco Use    Smoking status: Never    Smokeless tobacco: Never   Vaping Use    Vaping status: Never Used   Substance and Sexual Activity    Alcohol use: Yes     Alcohol/week: 10.0 standard drinks of alcohol     Types: 10 Cans of beer per week    Drug use: Never    Sexual activity: Yes     Partners: Female     Comment: One partner ()           Objective     /80 (BP Location: Left arm, Patient Position: Sitting, Cuff Size: Adult)   Pulse 81   Ht 5' 11\" (1.803 m)   Wt 82.1 kg (181 lb)   SpO2 99%   BMI 25.24 kg/m²   Physical Exam  Vitals reviewed.   Constitutional:       General: He is not in acute distress.     Appearance: Normal appearance. He is normal weight. He is not ill-appearing, toxic-appearing or diaphoretic.   HENT:      Head: Normocephalic and atraumatic.      Nose: Nose normal.      Mouth/Throat:      Mouth: Mucous membranes are moist.   Eyes:      Extraocular Movements: Extraocular movements intact.   Pulmonary:      Effort: Pulmonary effort is normal. No respiratory distress.   Abdominal:      General: There is no distension.      Palpations: Abdomen is soft.   Genitourinary:     Penis: Normal.       Testes: Normal.   Musculoskeletal:         General: Normal range of motion.      Cervical back: Neck supple.   Neurological:      Mental Status: He is alert and oriented to person, place, and time.   Psychiatric:         Mood and Affect: Mood normal.         Behavior: Behavior normal.         Thought Content: Thought content normal.         Judgment: Judgment normal.       Results  No results found for: \"PSA\"  Lab Results   Component Value Date    CALCIUM 9.2 08/14/2020    K 4.2 08/14/2020    CO2 28 08/14/2020    CL 99 (L) 08/14/2020    BUN 10 08/14/2020    CREATININE 1.16 08/14/2020     Lab Results   Component Value Date    WBC 6.51 08/14/2020    " HGB 17.3 (H) 08/14/2020    HCT 50.5 (H) 08/14/2020    MCV 89 08/14/2020     08/14/2020       Office Urine Dip  No results found for this or any previous visit (from the past 1 hour(s)).]    Administrative Statements

## 2024-11-06 NOTE — ASSESSMENT & PLAN NOTE
Patient has 2 children and he is aware that vasectomy may be irreversible.  He understands risks of bleeding infection recanalization persistent recurrent fertility pain testicular atrophy fluid collections.  He agrees to the procedure and provided verbal and signed informed consent.  Orders:    Ambulatory Referral to Urology

## 2024-11-06 NOTE — PATIENT INSTRUCTIONS
"Patient Education     Vasectomy   The Basics   Written by the doctors and editors at [facility]   What is a vasectomy? -- A vasectomy is a procedure that can be done as a type of long-term birth control. After a successful vasectomy, you cannot get a partner pregnant.  How does a vasectomy prevent pregnancy? -- A vasectomy prevents pregnancy by blocking the path the sperm takes to leave the body (figure 1).  Sperm are made in the testicles. The testicles are located inside a skin sac called the \"scrotum.\" Sperm are stored in the epididymis, which is a small organ that sits on top of the testicles. During ejaculation, sperm travel from the epididymis through tubes and out the end of the penis.  During a vasectomy, a doctor cuts and blocks a tube called the \"vas deferens\" on each side. This prevents sperm from leaving the body. After a vasectomy, you can still ejaculate fluid, called semen. But the semen does not have any sperm in it.  Why might I choose to have a vasectomy? -- You might choose to have a vasectomy if you do not want any more biological children, and do not want to use birth control each time you have sex.  Tell your doctor if you have any questions or worries about having a vasectomy. They can talk with you and tell you about the procedure.  Some people choose to have a sample of their sperm saved before they have a vasectomy. If you want this, talk with your doctor.  What happens during a vasectomy? -- A vasectomy is done in a doctor's office and takes about 30 minutes. During the procedure, a doctor numbs the skin on the scrotum. Then, they make a small cut in the skin to reach the vas deferens, cut it, and seal it off. The procedure does not hurt, but some people can feel cramping or pulling.  What happens after a vasectomy? -- You can go home right after the procedure, but you will need to rest for 2 to 3 days. After a vasectomy, you will likely have some discomfort and bruising in your scrotum. " "Your doctor will tell you which pain-relieving medicines to take. They might also prescribe a medicine to treat your pain.  Your doctor will give you instructions about what you should and should not do after your vasectomy. They will probably tell you to:   Wear a jock strap to hold the bandage in place.   Avoid bathing and swimming for 1 to 2 days.   Avoid lifting heavy objects or exercising too hard for 7 days.   Wait 7 days before having sex. After that, you must use another form of birth control for a few months to prevent pregnancy.  What are the side effects of a vasectomy? -- Side effects are uncommon. When they do happen, they can include:   Severe pain in the scrotum   Bleeding in the scrotum   Infection of the skin around the cut  If you have any side effects, tell your doctor. Some side effects go away over time, but others might need treatment.  How long does it take for a vasectomy to work? -- It takes a few months. That's because the tubes can still have sperm in them.  You will need to ejaculate 20 or more times after a vasectomy to clear out all of the sperm from the tubes. Because of this, it's important to use another type of birth control for a few months to prevent getting a partner pregnant.  How will I know if my vasectomy worked? -- You will have a follow-up test called a \"sperm count\" to make sure that your semen does not have any sperm in it. This is usually done 3 months after a vasectomy.  A sperm count checks how many sperm are in a sample of semen. For this test, you will provide a sample of your semen.  If your sample has no sperm, you cannot get a partner pregnant. You can stop using other birth control.  If your sample does have sperm in it, you could get a partner pregnant. You should still use birth control until you have another sperm count done and the sample has no sperm.  What if I change my mind after having a vasectomy? -- If you had a vasectomy and decide that you do want to " "be able to get a partner pregnant, talk with your doctor. A surgery to reconnect the vas deferens and open the sperm's path can be done. But this surgery does not always work.  Does a vasectomy prevent me getting a disease from sex? -- No. A vasectomy does not prevent you from getting or spreading a disease from sex. To prevent getting or spreading a disease from sex, you should use a type of protection called a condom.  All topics are updated as new evidence becomes available and our peer review process is complete.  This topic retrieved from [employer] on: Feb 26, 2024.  Topic 72531 Version 9.0  Release: 32.2.4 - C32.56  © 2024 UpToDate, Inc. and/or its affiliates. All rights reserved.  figure 1: Vasectomy     A vasectomy is a procedure that can be done as a type of long-term birth control. After a successful vasectomy, you cannot get a partner pregnant.  Sperm are made in the testicles and stored in the epididymis. During a vasectomy, a doctor cuts and blocks off the 2 tubes that carry sperm out of the epididymis. These tubes (1 on the left, and 1 on the right) are called the \"vas deferens.\" After the surgery, sperm get reabsorbed into the body. The sperm do not come out during ejaculation.  Graphic 20603 Version 14.0  Consumer Information Use and Disclaimer   Disclaimer: This generalized information is a limited summary of diagnosis, treatment, and/or medication information. It is not meant to be comprehensive and should be used as a tool to help the user understand and/or assess potential diagnostic and treatment options. It does NOT include all information about conditions, treatments, medications, side effects, or risks that may apply to a specific patient. It is not intended to be medical advice or a substitute for the medical advice, diagnosis, or treatment of a health care provider based on the health care provider's examination and assessment of a patient's specific and unique circumstances. Patients must " speak with a health care provider for complete information about their health, medical questions, and treatment options, including any risks or benefits regarding use of medications. This information does not endorse any treatments or medications as safe, effective, or approved for treating a specific patient. UpToDate, Inc. and its affiliates disclaim any warranty or liability relating to this information or the use thereof.The use of this information is governed by the Terms of Use, available at https://www.woltersTrueFacetuwer.com/en/know/clinical-effectiveness-terms. 2024© UpToDate, Inc. and its affiliates and/or licensors. All rights reserved.  Copyright   © 2024 UpToDate, Inc. and/or its affiliates. All rights reserved.

## 2024-11-06 NOTE — LETTER
2024       No Recipients    Patient: Emeterio Shelley   YOB: 1985   Date of Visit: 2024       Dear Dr. Smith:    Thank you for referring Emeterio Shelley to me for evaluation. Below are my notes for this consultation.    If you have questions, please do not hesitate to call me. I look forward to following your patient along with you.         Sincerely,        Maco Rivas MD        CC:   No Recipients    Maco Rivas MD  2024  3:59 PM  Sign when Signing Visit  Ambulatory Visit  Name: Emeterio Shelley      : 1985      MRN: [id number]  Encounter Provider: Maco Rivas MD  Encounter Date: 2024   Encounter department: [facility] [place]    Assessment & Plan  Vasectomy evaluation  Patient has 2 children and he is aware that vasectomy may be irreversible.  He understands risks of bleeding infection recanalization persistent recurrent fertility pain testicular atrophy fluid collections.  He agrees to the procedure and provided verbal and signed informed consent.  Orders:  •  Ambulatory Referral to Urology      History of Present Illness  {?Quick Links Encounters * My Last Note * Last Note in Specialty * Snapshot * Since Last Visit * History :70133}  Emeterio Shelley is a 39 y.o. male who presents requesting elective sterilization in the form of vasectomy.  The patient denies any pre-existing conditions were in regard to his genitalia or urologic condition.  He is otherwise healthy  Greater than 50% counseling face-to-face with patient 20 minutes    History obtained from : patient  Review of Systems   All other systems reviewed and are negative.    Pertinent Medical History     {There is no content from the last Pertinent Medical History section.}    Medical History Reviewed by provider this encounter:  Problems       Past Medical History  Past Medical History:   Diagnosis Date   • Allergic 03    Seasonal Allergies since I was 18  "    History reviewed. No pertinent surgical history.  Family History   Problem Relation Age of Onset   • No Known Problems Mother    • No Known Problems Father    • Cancer Sister         My sister was diagnosed with colon cancer when she was 26 (now 40)     Current Outpatient Medications on File Prior to Visit   Medication Sig Dispense Refill   • multivitamin (THERAGRAN) TABS Take 1 tablet by mouth daily (Patient not taking: Reported on 11/6/2024)       No current facility-administered medications on file prior to visit.   No Known Allergies   Current Outpatient Medications on File Prior to Visit   Medication Sig Dispense Refill   • multivitamin (THERAGRAN) TABS Take 1 tablet by mouth daily (Patient not taking: Reported on 11/6/2024)       No current facility-administered medications on file prior to visit.      Social History     Tobacco Use   • Smoking status: Never   • Smokeless tobacco: Never   Vaping Use   • Vaping status: Never Used   Substance and Sexual Activity   • Alcohol use: Yes     Alcohol/week: 10.0 standard drinks of alcohol     Types: 10 Cans of beer per week   • Drug use: Never   • Sexual activity: Yes     Partners: Female     Comment: One partner ()           Objective  {?Quick Links Trend Vitals * Enter New Vitals * Results Review * Timeline (Adult) * Labs * Imaging * Cardiology * Procedures * Lung Cancer Screening * Surgical eConsent :63965}  /80 (BP Location: Left arm, Patient Position: Sitting, Cuff Size: Adult)   Pulse 81   Ht 5' 11\" (1.803 m)   Wt 82.1 kg (181 lb)   SpO2 99%   BMI 25.24 kg/m²   Physical Exam  Vitals reviewed.   Constitutional:       General: He is not in acute distress.     Appearance: Normal appearance. He is normal weight. He is not ill-appearing, toxic-appearing or diaphoretic.   HENT:      Head: Normocephalic and atraumatic.      Nose: Nose normal.      Mouth/Throat:      Mouth: Mucous membranes are moist.   Eyes:      Extraocular Movements: Extraocular " "movements intact.   Pulmonary:      Effort: Pulmonary effort is normal. No respiratory distress.   Abdominal:      General: There is no distension.      Palpations: Abdomen is soft.   Genitourinary:     Penis: Normal.       Testes: Normal.   Musculoskeletal:         General: Normal range of motion.      Cervical back: Neck supple.   Neurological:      Mental Status: He is alert and oriented to person, place, and time.   Psychiatric:         Mood and Affect: Mood normal.         Behavior: Behavior normal.         Thought Content: Thought content normal.         Judgment: Judgment normal.       Results  No results found for: \"PSA\"  Lab Results   Component Value Date    CALCIUM 9.2 08/14/2020    K 4.2 08/14/2020    CO2 28 08/14/2020    CL 99 (L) 08/14/2020    BUN 10 08/14/2020    CREATININE 1.16 08/14/2020     Lab Results   Component Value Date    WBC 6.51 08/14/2020    HGB 17.3 (H) 08/14/2020    HCT 50.5 (H) 08/14/2020    MCV 89 08/14/2020     08/14/2020       Office Urine Dip  No results found for this or any previous visit (from the past 1 hour(s)).]    Administrative Statements{?Quick Links Full Problem List * Level of Service * Merged with [facility]/PCSP:90564}  {Time Spent Statement (Optional):71531}      "

## 2024-12-13 ENCOUNTER — PROCEDURE VISIT (OUTPATIENT)
Dept: UROLOGY | Facility: MEDICAL CENTER | Age: 39
End: 2024-12-13
Payer: COMMERCIAL

## 2024-12-13 VITALS
DIASTOLIC BLOOD PRESSURE: 82 MMHG | BODY MASS INDEX: 25.93 KG/M2 | SYSTOLIC BLOOD PRESSURE: 144 MMHG | OXYGEN SATURATION: 99 % | HEART RATE: 88 BPM | WEIGHT: 185.2 LBS | HEIGHT: 71 IN

## 2024-12-13 DIAGNOSIS — Z30.2 ENCOUNTER FOR STERILIZATION: Primary | ICD-10-CM

## 2024-12-13 PROCEDURE — 88302 TISSUE EXAM BY PATHOLOGIST: CPT | Performed by: PATHOLOGY

## 2024-12-13 PROCEDURE — 55250 REMOVAL OF SPERM DUCT(S): CPT | Performed by: UROLOGY

## 2024-12-13 NOTE — PROGRESS NOTES
"    Vasectomy     Date/Time  12/13/2024 8:30 AM     Performed by  Maco Rivas MD   Authorized by  Maco Rivas MD     [facility] Protocol   Consent: Verbal consent obtained. Written consent obtained.  Risks and benefits: risks, benefits and alternatives were discussed  Consent given by: patient  Time out: Immediately prior to procedure a \"time out\" was called to verify the correct patient, procedure, equipment, support staff and site/side marked as required.  Patient understanding: patient states understanding of the procedure being performed  Patient consent: the patient's understanding of the procedure matches consent given  Procedure consent: procedure consent matches procedure scheduled  Relevant documents: relevant documents present and verified  Required items: required blood products, implants, devices, and special equipment available  Patient identity confirmed: verbally with patient      Local anesthesia used: yes     Anesthesia   Local anesthesia used: yes  Local Anesthetic: lidocaine 2% without epinephrine  Anesthetic total: 10 mL     Sedation   Patient sedated: no        Specimen: yes    Culture: no   Procedure Details   Procedure Notes: I spoke to the patient immediately prior to the procedure and agreed upon the procedure as discussed prior.  Risks and complications were again discussed and expressed understanding.  He also expressed understanding of maintaining contraception for at least 8 weeks at which time semen profile should be performed.  If the sperm count is 0 he will be instructed to discontinue contraception.  He should not discontinue contraception unless he receives these instructions from this office.    The patient was placed in the supine position and the genitalia prepped with Betadine.  Lidocaine 2% without epinephrine was injected into the anterior scrotal wall in the midline and along the left and right vas deferens for bilateral spermatic cord blocks.  A sharp hemostat " was used to make a 3 to 4 mm puncture wound to the anterior scrotal wall in the midline and first the right and then the left vas deferens was grasped through that puncture wound using a cervical clamp and brought up to the puncture wound surface.  Sharp hemostat was then used to clean the adventitia off the vas.  A segment of vas deferens was excised using surgical scissors approximately 0.5 cm in length bilaterally.  All 4 free ends were then cauterized intraluminally.  The abdominal ends of the vas deferens were both duct inside surrounding adventitial tissue and secured there using a surgical clip.  There is no active bleeding.  All specimens were collected and sent to pathology for identification.  A sterile Band-Aid was placed over the puncture wound and the patient recovered uneventfully.  There were no complications.  Patient Transportation: confirmed  Patient tolerance: patient tolerated the procedure well with no immediate complications

## 2024-12-18 PROCEDURE — 88302 TISSUE EXAM BY PATHOLOGIST: CPT | Performed by: PATHOLOGY

## 2025-04-08 ENCOUNTER — TELEPHONE (OUTPATIENT)
Age: 40
End: 2025-04-08

## 2025-04-08 NOTE — TELEPHONE ENCOUNTER
Contacted patient off of [facility]  wait list to verify needs of services in attempts to update list with patient preferences. spoke with patient whom stated  Is established with services already. Pt removed from [facility].